# Patient Record
Sex: FEMALE | Race: BLACK OR AFRICAN AMERICAN | NOT HISPANIC OR LATINO | URBAN - METROPOLITAN AREA
[De-identification: names, ages, dates, MRNs, and addresses within clinical notes are randomized per-mention and may not be internally consistent; named-entity substitution may affect disease eponyms.]

---

## 2020-08-11 ENCOUNTER — OFFICE VISIT (OUTPATIENT)
Dept: URBAN - METROPOLITAN AREA CLINIC 70 | Facility: CLINIC | Age: 45
End: 2020-08-11

## 2021-01-05 ENCOUNTER — OFFICE VISIT (OUTPATIENT)
Dept: URBAN - METROPOLITAN AREA CLINIC 70 | Facility: CLINIC | Age: 46
End: 2021-01-05

## 2021-11-04 ENCOUNTER — OFFICE VISIT (OUTPATIENT)
Dept: URBAN - METROPOLITAN AREA CLINIC 70 | Facility: CLINIC | Age: 46
End: 2021-11-04

## 2021-11-17 ENCOUNTER — LAB OUTSIDE AN ENCOUNTER (OUTPATIENT)
Dept: URBAN - METROPOLITAN AREA CLINIC 70 | Facility: CLINIC | Age: 46
End: 2021-11-17

## 2021-11-17 ENCOUNTER — WEB ENCOUNTER (OUTPATIENT)
Dept: URBAN - METROPOLITAN AREA CLINIC 70 | Facility: CLINIC | Age: 46
End: 2021-11-17

## 2021-11-17 ENCOUNTER — OFFICE VISIT (OUTPATIENT)
Dept: URBAN - METROPOLITAN AREA CLINIC 70 | Facility: CLINIC | Age: 46
End: 2021-11-17
Payer: SELF-PAY

## 2021-11-17 VITALS
HEART RATE: 102 BPM | DIASTOLIC BLOOD PRESSURE: 81 MMHG | HEIGHT: 61 IN | BODY MASS INDEX: 24.17 KG/M2 | WEIGHT: 128 LBS | SYSTOLIC BLOOD PRESSURE: 117 MMHG | TEMPERATURE: 97.9 F

## 2021-11-17 DIAGNOSIS — D50.9 IRON DEFICIENCY ANEMIA, UNSPECIFIED IRON DEFICIENCY ANEMIA TYPE: ICD-10-CM

## 2021-11-17 DIAGNOSIS — K59.04 CHRONIC IDIOPATHIC CONSTIPATION: ICD-10-CM

## 2021-11-17 DIAGNOSIS — K30 INDIGESTION: ICD-10-CM

## 2021-11-17 PROBLEM — 82934008: Status: ACTIVE | Noted: 2021-11-17

## 2021-11-17 PROBLEM — 162031009: Status: ACTIVE | Noted: 2021-11-17

## 2021-11-17 PROCEDURE — 99204 OFFICE O/P NEW MOD 45 MIN: CPT | Performed by: REGISTERED NURSE

## 2021-11-17 RX ORDER — NAPROXEN 500 MG/1
2 TABLETS TABLET ORAL PRN
Refills: 0 | Status: ACTIVE | COMMUNITY
Start: 1900-01-01

## 2021-11-17 RX ORDER — PANTOPRAZOLE SODIUM 20 MG/1
1 TABLET TABLET, DELAYED RELEASE ORAL ONCE A DAY
Qty: 30 | OUTPATIENT
Start: 2021-11-17

## 2021-11-17 RX ORDER — ERGOCALCIFEROL 1.25 MG/1
CAPSULE, LIQUID FILLED ORAL
Qty: 4 | Status: ACTIVE | COMMUNITY

## 2021-11-17 RX ORDER — ALBUTEROL SULFATE 108 UG/1
INHALANT RESPIRATORY (INHALATION)
Qty: 6.7 | Status: ACTIVE | COMMUNITY

## 2021-11-17 RX ORDER — CETIRIZINE HYDROCHLORIDE 10 MG/1
TABLET ORAL
Qty: 30 | Status: ACTIVE | COMMUNITY

## 2021-11-17 RX ORDER — POLYETHYLENE GLYCOL 3350 17 G/17G
AS DIRECTED POWDER, FOR SOLUTION ORAL
OUTPATIENT
Start: 2021-11-17

## 2021-11-17 NOTE — HPI-TODAY'S VISIT:
Patient was referred by  Alejandra Curry MD  for an evaluation of iron deficiency anemia . A copy of this note will be sent to the reffering provider.  Pt states she has experienced anemia for multiple years and has recently started iron infusions.  She admits black stools in the past, but has not seen any for the past 2-3 months.   Pt also admits to menorrhagia with menstrual cycles lasting up to 2 weeks.  Labs on 10/20/2021 show HGb 5.0, MCV 58.8.  Labs on 10/20/2021 following iron infusion were HGb 8.0 and MCV 66.3.  Patient has never had an EGD or colonoscopy.   The patient also complains of indigestion.  Symptoms include a belching, and a burning sensation in her throat and chest after eating.  Symptoms have been present for a year.  Pt states she has tried pepcid in the past with minimal relief.  Denies nausea or vomiting.  She also admits to occasional constipation.  Pt states she has never tried anything for her constipation.  Denies pain with bowel movements, rectal bleeding, straining, or tearing sensations with bowel movements.  Discussed increasing hydration and was given samples for Miralax.

## 2021-11-23 ENCOUNTER — OFFICE VISIT (OUTPATIENT)
Dept: URBAN - METROPOLITAN AREA SURGERY CENTER 24 | Facility: SURGERY CENTER | Age: 46
End: 2021-11-23

## 2021-12-02 ENCOUNTER — OFFICE VISIT (OUTPATIENT)
Dept: URBAN - METROPOLITAN AREA SURGERY CENTER 24 | Facility: SURGERY CENTER | Age: 46
End: 2021-12-02

## 2021-12-23 ENCOUNTER — OFFICE VISIT (OUTPATIENT)
Dept: URBAN - METROPOLITAN AREA SURGERY CENTER 24 | Facility: SURGERY CENTER | Age: 46
End: 2021-12-23

## 2022-01-03 PROBLEM — 87522002: Status: ACTIVE | Noted: 2021-11-17

## 2022-01-12 ENCOUNTER — OFFICE VISIT (OUTPATIENT)
Dept: URBAN - METROPOLITAN AREA SURGERY CENTER 24 | Facility: SURGERY CENTER | Age: 47
End: 2022-01-12

## 2023-03-12 ENCOUNTER — P2P PATIENT RECORD (OUTPATIENT)
Age: 48
End: 2023-03-12

## 2023-06-30 ENCOUNTER — OFFICE VISIT (OUTPATIENT)
Dept: URBAN - METROPOLITAN AREA CLINIC 70 | Facility: CLINIC | Age: 48
End: 2023-06-30

## 2023-07-12 ENCOUNTER — LAB OUTSIDE AN ENCOUNTER (OUTPATIENT)
Dept: URBAN - METROPOLITAN AREA CLINIC 70 | Facility: CLINIC | Age: 48
End: 2023-07-12

## 2023-07-12 ENCOUNTER — OFFICE VISIT (OUTPATIENT)
Dept: URBAN - METROPOLITAN AREA CLINIC 70 | Facility: CLINIC | Age: 48
End: 2023-07-12
Payer: SELF-PAY

## 2023-07-12 VITALS
HEIGHT: 61 IN | TEMPERATURE: 97.2 F | WEIGHT: 130.2 LBS | BODY MASS INDEX: 24.58 KG/M2 | DIASTOLIC BLOOD PRESSURE: 74 MMHG | HEART RATE: 88 BPM | SYSTOLIC BLOOD PRESSURE: 112 MMHG

## 2023-07-12 DIAGNOSIS — K58.2 IRRITABLE BOWEL SYNDROME WITH BOTH CONSTIPATION AND DIARRHEA: ICD-10-CM

## 2023-07-12 DIAGNOSIS — D50.9 IRON DEFICIENCY ANEMIA, UNSPECIFIED IRON DEFICIENCY ANEMIA TYPE: ICD-10-CM

## 2023-07-12 PROCEDURE — 99214 OFFICE O/P EST MOD 30 MIN: CPT | Performed by: REGISTERED NURSE

## 2023-07-12 RX ORDER — NAPROXEN 500 MG/1
2 TABLETS TABLET ORAL PRN
Refills: 0 | Status: ON HOLD | COMMUNITY
Start: 1900-01-01

## 2023-07-12 RX ORDER — POLYETHYLENE GLYCOL 3350 17 G/17G
AS DIRECTED POWDER, FOR SOLUTION ORAL
Status: ON HOLD | COMMUNITY
Start: 2021-11-17

## 2023-07-12 RX ORDER — PANTOPRAZOLE SODIUM 20 MG/1
1 TABLET TABLET, DELAYED RELEASE ORAL ONCE A DAY
Qty: 30 | Status: ON HOLD | COMMUNITY
Start: 2021-11-17

## 2023-07-12 RX ORDER — ALBUTEROL SULFATE 108 UG/1
INHALANT RESPIRATORY (INHALATION)
Qty: 6.7 | Status: ON HOLD | COMMUNITY

## 2023-07-12 RX ORDER — ERGOCALCIFEROL 1.25 MG/1
CAPSULE, LIQUID FILLED ORAL
Qty: 4 | Status: ON HOLD | COMMUNITY

## 2023-07-12 RX ORDER — CETIRIZINE HYDROCHLORIDE 10 MG/1
TABLET ORAL
Qty: 30 | COMMUNITY

## 2023-07-12 NOTE — HPI-TODAY'S VISIT:
Pt referred for anemia. They deny any GI bleeding. Labs on 3/3/23 show Hgb 9.1, MCV 71, Iron 20, Ferritin 3.2, Iron Sat 4.9% and TIBC 411. She does have heavy menstrual bleeding and is being scheduled for surgery to remove fibroids. She has no rectal bleeding but does c/o abdominal cramping after meals with alternating constipation and diarrhea. No improvement in the past with fiber or Miralax.

## 2023-07-26 ENCOUNTER — OFFICE VISIT (OUTPATIENT)
Dept: URBAN - METROPOLITAN AREA SURGERY CENTER 24 | Facility: SURGERY CENTER | Age: 48
End: 2023-07-26

## 2024-03-26 ENCOUNTER — OV EP (OUTPATIENT)
Dept: URBAN - METROPOLITAN AREA CLINIC 70 | Facility: CLINIC | Age: 49
End: 2024-03-26

## 2024-03-26 RX ORDER — ALBUTEROL SULFATE 108 UG/1
INHALANT RESPIRATORY (INHALATION)
Qty: 6.7 | Status: DISCONTINUED | COMMUNITY

## 2024-03-26 RX ORDER — PANTOPRAZOLE SODIUM 20 MG/1
1 TABLET TABLET, DELAYED RELEASE ORAL ONCE A DAY
Qty: 30 | Status: DISCONTINUED | COMMUNITY
Start: 2021-11-17

## 2024-03-26 RX ORDER — NAPROXEN 500 MG/1
2 TABLETS TABLET ORAL PRN
Refills: 0 | Status: DISCONTINUED | COMMUNITY
Start: 1900-01-01

## 2024-03-26 RX ORDER — CETIRIZINE HYDROCHLORIDE 10 MG/1
TABLET ORAL
Qty: 30 | Status: DISCONTINUED | COMMUNITY

## 2024-03-26 RX ORDER — POLYETHYLENE GLYCOL 3350 17 G/17G
AS DIRECTED POWDER, FOR SOLUTION ORAL
Status: DISCONTINUED | COMMUNITY
Start: 2021-11-17

## 2024-03-26 RX ORDER — ERGOCALCIFEROL 1.25 MG/1
CAPSULE, LIQUID FILLED ORAL
Qty: 4 | Status: DISCONTINUED | COMMUNITY

## 2024-09-23 ENCOUNTER — DASHBOARD ENCOUNTERS (OUTPATIENT)
Age: 49
End: 2024-09-23

## 2024-09-23 ENCOUNTER — OFFICE VISIT (OUTPATIENT)
Dept: URBAN - METROPOLITAN AREA CLINIC 70 | Facility: CLINIC | Age: 49
End: 2024-09-23

## 2024-09-23 RX ORDER — MEDROXYPROGESTERONE ACETATE 150 MG/ML
1 ML INJECTION, SUSPENSION INTRAMUSCULAR
Status: ACTIVE | COMMUNITY

## 2024-09-23 RX ORDER — CYPROHEPTADINE HYDROCHLORIDE 4 MG/1
1 TABLET TABLET ORAL TWICE A DAY
Status: ACTIVE | COMMUNITY

## 2024-09-23 RX ORDER — OMEGA-3/DHA/EPA/FISH OIL 120-180 MG
1 CAPSULE CAPSULE ORAL ONCE A DAY
Status: ACTIVE | COMMUNITY